# Patient Record
Sex: MALE | Race: WHITE | URBAN - METROPOLITAN AREA
[De-identification: names, ages, dates, MRNs, and addresses within clinical notes are randomized per-mention and may not be internally consistent; named-entity substitution may affect disease eponyms.]

---

## 2017-01-13 ENCOUNTER — RECORDS - HEALTHEAST (OUTPATIENT)
Dept: LAB | Facility: CLINIC | Age: 47
End: 2017-01-13

## 2017-01-17 LAB — ANA SER QL: 0.2 U

## 2017-02-01 ENCOUNTER — RECORDS - HEALTHEAST (OUTPATIENT)
Dept: LAB | Facility: CLINIC | Age: 47
End: 2017-02-01

## 2017-02-01 LAB
C3 SERPL-MCNC: 140 MG/DL (ref 83–177)
C4 SERPL-MCNC: 21 MG/DL (ref 19–59)

## 2017-02-02 LAB
ANA SER QL: 0.2 U
SYPHILIS RPR SCREEN - HISTORICAL: NORMAL

## 2017-02-07 LAB — DNA (DS) ANTIBODY - HISTORICAL: 2 IU

## 2018-01-30 ENCOUNTER — RECORDS - HEALTHEAST (OUTPATIENT)
Dept: ADMINISTRATIVE | Facility: OTHER | Age: 48
End: 2018-01-30

## 2018-01-30 LAB
LAB AP CHARGES (HE HISTORICAL CONVERSION): NORMAL
PATH REPORT.COMMENTS IMP SPEC: NORMAL
PATH REPORT.COMMENTS IMP SPEC: NORMAL
PATH REPORT.FINAL DX SPEC: NORMAL
PATH REPORT.GROSS SPEC: NORMAL
PATH REPORT.MICROSCOPIC SPEC OTHER STN: NORMAL
PATH REPORT.MICROSCOPIC SPEC OTHER STN: NORMAL
PATH REPORT.RELEVANT HX SPEC: NORMAL
RESULT FLAG (HE HISTORICAL CONVERSION): NORMAL

## 2018-11-06 ENCOUNTER — RECORDS - HEALTHEAST (OUTPATIENT)
Dept: LAB | Facility: CLINIC | Age: 48
End: 2018-11-06

## 2018-11-06 LAB
CHOLEST SERPL-MCNC: 239 MG/DL
FASTING STATUS PATIENT QL REPORTED: ABNORMAL
HDLC SERPL-MCNC: 55 MG/DL
LDLC SERPL CALC-MCNC: 140 MG/DL
PSA SERPL-MCNC: 0.4 NG/ML (ref 0–2.5)
TRIGL SERPL-MCNC: 222 MG/DL

## 2018-11-07 LAB — 25(OH)D3 SERPL-MCNC: 32.7 NG/ML (ref 30–80)

## 2018-11-19 ENCOUNTER — RECORDS - HEALTHEAST (OUTPATIENT)
Dept: LAB | Facility: CLINIC | Age: 48
End: 2018-11-19

## 2018-11-21 LAB — BACTERIA SPEC CULT: NORMAL

## 2021-06-02 ENCOUNTER — HOSPITAL ENCOUNTER (EMERGENCY)
Dept: EMERGENCY MEDICINE | Facility: CLINIC | Age: 51
Discharge: HOME | End: 2021-06-02
Attending: EMERGENCY MEDICINE
Payer: COMMERCIAL

## 2021-06-02 DIAGNOSIS — R07.9 CHEST PAIN, UNSPECIFIED TYPE: ICD-10-CM

## 2021-06-02 LAB
ALBUMIN SERPL-MCNC: 3.8 G/DL (ref 3.5–5)
ALP SERPL-CCNC: 90 U/L (ref 45–120)
ALT SERPL W P-5'-P-CCNC: 23 U/L (ref 0–45)
ANION GAP SERPL CALCULATED.3IONS-SCNC: 9 MMOL/L (ref 5–18)
AST SERPL W P-5'-P-CCNC: 27 U/L (ref 0–40)
ATRIAL RATE - MUSE: 64 BPM
BASOPHILS # BLD AUTO: 0.1 THOU/UL (ref 0–0.2)
BASOPHILS NFR BLD AUTO: 2 % (ref 0–2)
BILIRUB SERPL-MCNC: 0.4 MG/DL (ref 0–1)
BNP SERPL-MCNC: 12 PG/ML (ref 0–38)
BUN SERPL-MCNC: 17 MG/DL (ref 8–22)
CALCIUM SERPL-MCNC: 9 MG/DL (ref 8.5–10.5)
CHLORIDE BLD-SCNC: 108 MMOL/L (ref 98–107)
CO2 SERPL-SCNC: 24 MMOL/L (ref 22–31)
CREAT SERPL-MCNC: 1.27 MG/DL (ref 0.7–1.3)
DIASTOLIC BLOOD PRESSURE - MUSE: NORMAL
EOSINOPHIL # BLD AUTO: 0.2 THOU/UL (ref 0–0.4)
EOSINOPHIL NFR BLD AUTO: 4 % (ref 0–6)
ERYTHROCYTE [DISTWIDTH] IN BLOOD BY AUTOMATED COUNT: 13.3 % (ref 11–14.5)
GFR SERPL CREATININE-BSD FRML MDRD: >60 ML/MIN/1.73M2
GLUCOSE BLD-MCNC: 96 MG/DL (ref 70–125)
HCT VFR BLD AUTO: 43.7 % (ref 40–54)
HGB BLD-MCNC: 14.8 G/DL (ref 14–18)
IMM GRANULOCYTES # BLD: 0 THOU/UL
IMM GRANULOCYTES NFR BLD: 0 %
INTERPRETATION ECG - MUSE: NORMAL
LYMPHOCYTES # BLD AUTO: 1.5 THOU/UL (ref 0.8–4.4)
LYMPHOCYTES NFR BLD AUTO: 29 % (ref 20–40)
MCH RBC QN AUTO: 31 PG (ref 27–34)
MCHC RBC AUTO-ENTMCNC: 33.9 G/DL (ref 32–36)
MCV RBC AUTO: 91 FL (ref 80–100)
MONOCYTES # BLD AUTO: 0.5 THOU/UL (ref 0–0.9)
MONOCYTES NFR BLD AUTO: 9 % (ref 2–10)
NEUTROPHILS # BLD AUTO: 3 THOU/UL (ref 2–7.7)
NEUTROPHILS NFR BLD AUTO: 56 % (ref 50–70)
P AXIS - MUSE: 45 DEGREES
PLATELET # BLD AUTO: 229 THOU/UL (ref 140–440)
PMV BLD AUTO: 10.2 FL (ref 8.5–12.5)
POTASSIUM BLD-SCNC: 4 MMOL/L (ref 3.5–5)
PR INTERVAL - MUSE: 184 MS
PROT SERPL-MCNC: 6.7 G/DL (ref 6–8)
QRS DURATION - MUSE: 92 MS
QT - MUSE: 412 MS
QTC - MUSE: 425 MS
R AXIS - MUSE: 30 DEGREES
RBC # BLD AUTO: 4.78 MILL/UL (ref 4.4–6.2)
SODIUM SERPL-SCNC: 141 MMOL/L (ref 136–145)
SYSTOLIC BLOOD PRESSURE - MUSE: NORMAL
T AXIS - MUSE: 12 DEGREES
TROPONIN I SERPL-MCNC: <0.01 NG/ML (ref 0–0.29)
VENTRICULAR RATE- MUSE: 64 BPM
WBC: 5.3 THOU/UL (ref 4–11)

## 2021-06-25 NOTE — ED TRIAGE NOTES
Pt reports tightness across chest that began around 2130 this evening. Pain radiates to back and throat. Denies shortness of breath, nausea, dizziness. Pt reports similar episodes about 3 times in the last few weeks that lasted about 20 minutes but this has persisted. Denies cardiac history.

## 2021-06-26 NOTE — ED PROVIDER NOTES
EMERGENCY DEPARTMENT ENCOUNTER      NAME: Ravi Flores  AGE: 50 y.o. male  YOB: 1970  MRN: [id number]  EVALUATION DATE & TIME: 2021  1:06 AM    PCP: Renee White PA-C    ED PROVIDER: Sonya Figueroa DO      Chief Complaint   Patient presents with     Chest Pain         FINAL IMPRESSION:  1. Chest pain, unspecified type          ED COURSE & MEDICAL DECISION MAKIN:16 AM Met with patient for initial interview and exam. Discussed initial plan for care for their stay in the emergency department.  2:05 AM Updated patient on results, performed bedside ECHO.  Discussed plan for discharge    The patient was interviewed and examined.  History in the chart was reviewed.  50 y.o. male presents to the Emergency Department for evaluation of chest pain.  Intermittent squeezing pressure to his chest that radiates up towards his jaw.  He has had a few episodes over the past week, begins at rest.  Persistent tonight.  Symptoms started around 4 hours prior to arrival.    MEDICAL DECISION MAKING: I was concerned about this patient's chest pain and considered multiple causes including but not limited to the following.       ?ACS: EKG does not show acute ischemic changes and cardiac enzymes are negative. Heart Score is low risk.    ?PE:  Wells Criteria is low risk.  PERC negative.  D-dimer deferred    ?Aortic Dissection: The onset of pain was neither sudden nor severe, no history of poorly controlled high blood pressure and radial / pedal pulses are symmetrical, There is no widening of the mediastinum on chest x-ray , and no marfanoid features are present.    ?Cardiac Tamponade:  EKG shows no decreased voltage, heart sounds are not diminished on exam.      ?PTX: No sign of pneumothorax on chest x-ray .     ?Esophageal Rupture: There was not preceding forceful vomiting or retching and the CXR does not show mediastinal free air.     ?Other causes considered:  pericarditis, pleural effusion, pneumonia and  referred pain from the ABD      HEART Score for Major Cardiac Events  History: Slightly suspicious  EKG: Normal  Age: 45 - 65  Risk factors: 1-2 risk factors  Initial troponin: < or equal to normal limit  HEART Score: 2  Scores 0-3: 0.9-1.7% risk of adverse cardiac event. In the HEART Score study, these patients were discharged (0.99% in the retrospective study, 1.7% in the prospective study).        IMPRESSION: Based on this patient's history, exam, and diagnostic results, the working diagnosis of this patient's chest pain is unclear.  Most likely GI source, cannot completely rule out angina.       DISPOSITION PLAN:    Discharge. Patient is appropriate for outpatient management with medications per discharge instructions.  Patient was given verbal and written discharge instructions for follow up as well as indications for return to the Emergency Department.    Referral placed to rapid access clinic.  Encouraged return if any acute worsening symptoms, progressive symptoms or any other concerns.     At the conclusion of the encounter I discussed  the results of all of the tests and the disposition with the patient and his wife.   All questions were answered.  The patient acknowledged understanding and was involved in the decision making regarding the overall care plan.      I discussed with the patient the utility, limitations and findings of the exam/interventions/studies done during this visit as well as the list of differential diagnosis and symptoms to monitor/return to ER for.  Additional verbal discharge instructions were provided.     MEDICATIONS GIVEN IN THE EMERGENCY:  Medications   sodium chloride flush 10 mL (NS) (has no administration in time range)       NEW PRESCRIPTIONS STARTED AT TODAY'S ER VISIT  There are no discharge medications for this patient.         =================================================================    HPI    Patient information was obtained from: Patient and wife    Use of  : N/A         Ravi Flores is a 50 y.o. male with a history of rheumatoid arthritis who presents for evaluation of chest pain.  He notes a few episodes over the past few weeks, seem to come on at rest.  In the past would only last for 20 minutes.  Tonight he came home from work, was resting and noted a crushing feeling in his chest, seem to radiate up his back and into his neck.  He took an aspirin.  Symptoms have come and gone.  Not related with eating.  He has a history of rheumatoid arthritis and has had some flares in the past week, notes flares in his jaw.  No diaphoresis, nausea, pale appearance, shortness of breath.  No recent surgeries.  No long travel.  He is a non-smoker.  No family history of coronary artery disease.  No history of hypertension, hyperlipidemia or diabetes.      REVIEW OF SYSTEMS   Review of Systems   Constitutional: Negative for chills, fatigue and fever.   Respiratory: Negative for cough and shortness of breath.    Cardiovascular: Positive for chest pain.   Gastrointestinal: Negative for abdominal pain, diarrhea, nausea and vomiting.   Musculoskeletal: Negative for arthralgias.   Skin: Negative for rash.   Neurological: Negative for light-headedness, numbness and headaches.   All other systems reviewed and are negative.    PAST MEDICAL HISTORY:  No past medical history on file.    PAST SURGICAL HISTORY:  No past surgical history on file.        CURRENT MEDICATIONS:    No current facility-administered medications on file prior to encounter.      No current outpatient medications on file prior to encounter.       ALLERGIES:  No Known Allergies    FAMILY HISTORY:  No family history on file.    SOCIAL HISTORY:   Social History     Socioeconomic History     Marital status:      Spouse name: Not on file     Number of children: Not on file     Years of education: Not on file     Highest education level: Not on file   Occupational History     Not on file   Social Needs      Financial resource strain: Not on file     Food insecurity     Worry: Not on file     Inability: Not on file     Transportation needs     Medical: Not on file     Non-medical: Not on file   Tobacco Use     Smoking status: Not on file   Substance and Sexual Activity     Alcohol use: Not on file     Drug use: Not on file     Sexual activity: Not on file   Lifestyle     Physical activity     Days per week: Not on file     Minutes per session: Not on file     Stress: Not on file   Relationships     Social connections     Talks on phone: Not on file     Gets together: Not on file     Attends [employer] service: Not on file     Active member of club or organization: Not on file     Attends meetings of clubs or organizations: Not on file     Relationship status: Not on file     Intimate partner violence     Fear of current or ex partner: Not on file     Emotionally abused: Not on file     Physically abused: Not on file     Forced sexual activity: Not on file   Other Topics Concern     Not on file   Social History Narrative     Not on file         PHYSICAL EXAM    VITALS  /78   Pulse 62   Temp 97.7  F (36.5  C) (Oral)   Resp 18   Wt (!) 250 lb (113.4 kg)   SpO2 96%   Physical Exam  Vitals signs and nursing note reviewed.   Constitutional:       General: He is not in acute distress.     Appearance: Normal appearance. He is well-developed. He is not diaphoretic.   HENT:      Head: Normocephalic and atraumatic.   Eyes:      Conjunctiva/sclera: Conjunctivae normal.      Pupils: Pupils are equal, round, and reactive to light.   Neck:      Musculoskeletal: Neck supple.      Trachea: No tracheal deviation.   Cardiovascular:      Rate and Rhythm: Normal rate and regular rhythm.      Pulses:           Radial pulses are 2+ on the right side and 2+ on the left side.      Heart sounds: Normal heart sounds.   Pulmonary:      Effort: Pulmonary effort is normal. No respiratory distress.      Breath sounds: Normal breath sounds.    Abdominal:      General: Bowel sounds are normal. There is no distension.      Palpations: Abdomen is soft.      Tenderness: There is no abdominal tenderness.   Musculoskeletal: Normal range of motion.   Skin:     General: Skin is warm and dry.   Neurological:      Mental Status: He is alert.            LAB:  All pertinent labs reviewed and interpreted.  Results for orders placed or performed during the hospital encounter of 06/02/21   Troponin I   Result Value Ref Range    Troponin I <0.01 0.00 - 0.29 ng/mL   Comprehensive Metabolic Panel   Result Value Ref Range    Sodium 141 136 - 145 mmol/L    Potassium 4.0 3.5 - 5.0 mmol/L    Chloride 108 (H) 98 - 107 mmol/L    CO2 24 22 - 31 mmol/L    Anion Gap, Calculation 9 5 - 18 mmol/L    Glucose 96 70 - 125 mg/dL    BUN 17 8 - 22 mg/dL    Creatinine 1.27 0.70 - 1.30 mg/dL    GFR MDRD Af Amer >60 >60 mL/min/1.73m2    GFR MDRD Non Af Amer >60 >60 mL/min/1.73m2    Bilirubin, Total 0.4 0.0 - 1.0 mg/dL    Calcium 9.0 8.5 - 10.5 mg/dL    Protein, Total 6.7 6.0 - 8.0 g/dL    Albumin 3.8 3.5 - 5.0 g/dL    Alkaline Phosphatase 90 45 - 120 U/L    AST 27 0 - 40 U/L    ALT 23 0 - 45 U/L   BNP(B-type Natriuretic Peptide)   Result Value Ref Range    BNP 12 0 - 38 pg/mL   HM1 (CBC with Diff)   Result Value Ref Range    WBC 5.3 4.0 - 11.0 thou/uL    RBC 4.78 4.40 - 6.20 mill/uL    Hemoglobin 14.8 14.0 - 18.0 g/dL    Hematocrit 43.7 40.0 - 54.0 %    MCV 91 80 - 100 fL    MCH 31.0 27.0 - 34.0 pg    MCHC 33.9 32.0 - 36.0 g/dL    RDW 13.3 11.0 - 14.5 %    Platelets 229 140 - 440 thou/uL    MPV 10.2 8.5 - 12.5 fL    Neutrophils % 56 50 - 70 %    Lymphocytes % 29 20 - 40 %    Monocytes % 9 2 - 10 %    Eosinophils % 4 0 - 6 %    Basophils % 2 0 - 2 %    Immature Granulocyte % 0 <=0 %    Neutrophils Absolute 3.0 2.0 - 7.7 thou/uL    Lymphocytes Absolute 1.5 0.8 - 4.4 thou/uL    Monocytes Absolute 0.5 0.0 - 0.9 thou/uL    Eosinophils Absolute 0.2 0.0 - 0.4 thou/uL    Basophils Absolute 0.1 0.0  - 0.2 thou/uL    Immature Granulocyte Absolute 0.0 <=0.0 thou/uL       RADIOLOGY:  Reviewed all pertinent imaging. Please see official radiology report.  Xr Chest 2 Views    Result Date: 6/2/2021  EXAM: XR CHEST 2 VIEWS LOCATION: [facility] DATE/TIME: 6/2/2021 1:50 AM INDICATION: Chest pain COMPARISON: None.     Negative chest.      EKG:    Performed at: 12:00 AM    Impression: Sinus rhythm, normal intervals, no acute ST elevation or depression, no prior EKG available for comparison    Rate: 64 bpm  Rhythm: Sinus  Axis: Normal  NH: 184 ms  QRS: 92 ms  QTC: 425 ms    I have independently review and interpreted the EKG(s)    PROCEDURES:   PROCEDURE:    Emergency Department Limited Bedside Screening Cardiac Ultrasound   INDICATIONS: chest pain   PROCEDURE PROVIDER:  Dr. Figueroa   WINDOW AND FINDINGS:    SUB-XYPHOID     :      normal wall motion, no effusion   PARASTERNAL    :  normal wall motion, no effusion     IMAGES PRINTED & SCANNED OR SAVED TO MEMORY: petty Figueroa DO  Emergency Medicine  [facility] EMERGENCY ROOM  [address] [place] [id number]  Dept: [phone]  Loc: [phone]     Mariela Figueroa DO  06/02/21 0247

## 2021-07-06 VITALS — WEIGHT: 250 LBS

## 2023-08-04 ENCOUNTER — LAB REQUISITION (OUTPATIENT)
Dept: LAB | Facility: CLINIC | Age: 53
End: 2023-08-04

## 2023-08-04 DIAGNOSIS — Z12.5 ENCOUNTER FOR SCREENING FOR MALIGNANT NEOPLASM OF PROSTATE: ICD-10-CM

## 2023-08-04 DIAGNOSIS — Z13.220 ENCOUNTER FOR SCREENING FOR LIPOID DISORDERS: ICD-10-CM

## 2023-08-04 LAB
CHOLEST SERPL-MCNC: 219 MG/DL
HDLC SERPL-MCNC: 63 MG/DL
LDLC SERPL CALC-MCNC: 123 MG/DL
NONHDLC SERPL-MCNC: 156 MG/DL
PSA SERPL DL<=0.01 NG/ML-MCNC: 0.51 NG/ML (ref 0–3.5)
TRIGL SERPL-MCNC: 164 MG/DL

## 2023-08-04 PROCEDURE — 80061 LIPID PANEL: CPT | Performed by: FAMILY MEDICINE

## 2023-08-04 PROCEDURE — G0103 PSA SCREENING: HCPCS | Performed by: FAMILY MEDICINE
